# Patient Record
Sex: MALE | Race: WHITE | NOT HISPANIC OR LATINO
[De-identification: names, ages, dates, MRNs, and addresses within clinical notes are randomized per-mention and may not be internally consistent; named-entity substitution may affect disease eponyms.]

---

## 2021-01-14 PROBLEM — Z00.00 ENCOUNTER FOR PREVENTIVE HEALTH EXAMINATION: Status: ACTIVE | Noted: 2021-01-14

## 2021-01-15 ENCOUNTER — APPOINTMENT (OUTPATIENT)
Dept: CARDIOLOGY | Facility: CLINIC | Age: 56
End: 2021-01-15
Payer: COMMERCIAL

## 2021-01-15 ENCOUNTER — NON-APPOINTMENT (OUTPATIENT)
Age: 56
End: 2021-01-15

## 2021-01-15 VITALS
DIASTOLIC BLOOD PRESSURE: 80 MMHG | HEART RATE: 97 BPM | RESPIRATION RATE: 16 BRPM | OXYGEN SATURATION: 96 % | HEIGHT: 69 IN | BODY MASS INDEX: 31.1 KG/M2 | TEMPERATURE: 97.7 F | WEIGHT: 210 LBS | SYSTOLIC BLOOD PRESSURE: 121 MMHG

## 2021-01-15 DIAGNOSIS — R06.83 SNORING: ICD-10-CM

## 2021-01-15 DIAGNOSIS — F41.9 ANXIETY DISORDER, UNSPECIFIED: ICD-10-CM

## 2021-01-15 DIAGNOSIS — F32.9 MAJOR DEPRESSIVE DISORDER, SINGLE EPISODE, UNSPECIFIED: ICD-10-CM

## 2021-01-15 DIAGNOSIS — R53.83 OTHER FATIGUE: ICD-10-CM

## 2021-01-15 PROCEDURE — 99072 ADDL SUPL MATRL&STAF TM PHE: CPT

## 2021-01-15 PROCEDURE — 93000 ELECTROCARDIOGRAM COMPLETE: CPT

## 2021-01-15 PROCEDURE — 99244 OFF/OP CNSLTJ NEW/EST MOD 40: CPT

## 2021-01-15 NOTE — PHYSICAL EXAM
[General Appearance - In No Acute Distress] : no acute distress [Normal Conjunctiva] : the conjunctiva exhibited no abnormalities [Normal Oral Mucosa] : normal oral mucosa [Auscultation Breath Sounds / Voice Sounds] : lungs were clear to auscultation bilaterally [Abdomen Soft] : soft [Abdomen Tenderness] : non-tender [Nail Clubbing] : no clubbing of the fingernails [Cyanosis, Localized] : no localized cyanosis [Oriented To Time, Place, And Person] : oriented to person, place, and time [Affect] : the affect was normal [FreeTextEntry1] : Cardiac: Normal S1 and split S2, no S3, no S4. No audible murmurs or rubs. Regular rate and rhythm.

## 2021-01-15 NOTE — HISTORY OF PRESENT ILLNESS
[FreeTextEntry1] : Patient presents today because since he had COVID-19, which was diagnosed on December 12 he has been having issues with high heart rate.  The illness itself was not very severe, primarily he had issues with chills and loss of taste and smell.  However since that time he has been tired and noticed his heart rate has been between 100 and 115 whenever he checks it.  He checks it a couple of times a day and it is always in the same range.  He does not report any symptoms when his heart rate is elevated.  He denies any palpitations, dizziness or lightheadedness.  He reports really no other physical symptoms besides the very significant fatigue.  Patient denies chest pain, shortness of breath, orthopnea, presyncope, syncope.

## 2021-01-15 NOTE — DISCUSSION/SUMMARY
[FreeTextEntry1] : 1.  Check echocardiogram given his tachycardia.\par 2.  Check Holter monitor to evaluate the degree of tachycardia and for any arrhythmia.\par 3.  Check exercise stress test given his fatigue on exertion and his tachycardia to rule out other arrhythmia.\par 4.  Check CTPA to rule out PE given COVID-19 and unexplained tachycardia.\par 5.  He will have blood work done.\par 6.  Check sleep study given his snoring and fatigue.\par 7.  Patient encouraged to work on diet to lose weight.\par 8.  Follow up here after testing, and will make further recommendations at that time.

## 2021-01-15 NOTE — ASSESSMENT
[FreeTextEntry1] : EKG: Sinus rhythm with no significant ST or T wave changes.\par \par 55-year-old man with no significant past medical history presents to me because of elevated heart rate since he had COVID-19 last month.  It is likely that the elevated heart rates are related to the illness.  He also has significant fatigue which is also likely related to his illness.  He is in sinus rhythm here today.  I will plan additional monitoring.  Also his symptoms are exertional and I will evaluate that as well.  Finally given the tachycardia and COVID-19 I will do a CT to rule out PE as another potential cause.  He also has significant snoring and a very short neck and I have recommended sleep study to rule out sleep apnea.

## 2021-02-10 ENCOUNTER — APPOINTMENT (OUTPATIENT)
Dept: DISASTER EMERGENCY | Facility: CLINIC | Age: 56
End: 2021-02-10

## 2021-02-11 LAB — SARS-COV-2 N GENE NPH QL NAA+PROBE: NOT DETECTED

## 2021-02-13 ENCOUNTER — OUTPATIENT (OUTPATIENT)
Dept: OUTPATIENT SERVICES | Facility: HOSPITAL | Age: 56
LOS: 1 days | End: 2021-02-13
Payer: COMMERCIAL

## 2021-02-13 DIAGNOSIS — G47.33 OBSTRUCTIVE SLEEP APNEA (ADULT) (PEDIATRIC): ICD-10-CM

## 2021-02-13 PROCEDURE — 95810 POLYSOM 6/> YRS 4/> PARAM: CPT | Mod: 26

## 2021-02-13 PROCEDURE — 95810 POLYSOM 6/> YRS 4/> PARAM: CPT

## 2021-02-15 ENCOUNTER — TRANSCRIPTION ENCOUNTER (OUTPATIENT)
Age: 56
End: 2021-02-15

## 2021-02-23 ENCOUNTER — APPOINTMENT (OUTPATIENT)
Dept: CARDIOLOGY | Facility: CLINIC | Age: 56
End: 2021-02-23

## 2021-03-09 ENCOUNTER — APPOINTMENT (OUTPATIENT)
Dept: CARDIOLOGY | Facility: CLINIC | Age: 56
End: 2021-03-09

## 2021-03-10 ENCOUNTER — APPOINTMENT (OUTPATIENT)
Dept: CARDIOLOGY | Facility: CLINIC | Age: 56
End: 2021-03-10
Payer: COMMERCIAL

## 2021-03-10 VITALS
SYSTOLIC BLOOD PRESSURE: 134 MMHG | HEART RATE: 104 BPM | TEMPERATURE: 97.6 F | HEIGHT: 69 IN | DIASTOLIC BLOOD PRESSURE: 84 MMHG | RESPIRATION RATE: 16 BRPM | WEIGHT: 212 LBS | BODY MASS INDEX: 31.4 KG/M2

## 2021-03-10 DIAGNOSIS — U07.1 COVID-19: ICD-10-CM

## 2021-03-10 PROCEDURE — 99214 OFFICE O/P EST MOD 30 MIN: CPT

## 2021-03-10 PROCEDURE — 99072 ADDL SUPL MATRL&STAF TM PHE: CPT

## 2021-03-10 NOTE — ASSESSMENT
[FreeTextEntry1] : Holter monitor January 15, 2021 showed a presenting rhythm is sinus with an average heart rate of 90 bpm, maximum 137, minimum 67 bpm. No arrhythmias were noted.\par \par Sleep study February 13, 2021 showed an AHI of 77.2 consistent with very severe sleep apnea. Keny oxygen saturation of 79%. 18.9% of the study spent with a saturation below 90%. No deep sleep was seen.\par \par 55-year-old man with no significant past medical history presented to me because of elevated heart rate since he had COVID-19. Patient has yet to complete echocardiogram, stress testing and CAT scan but those should still be done. His heart rates overall are not excessively elevated on his Holter monitor from January. His sleep study does show very severe sleep apnea and is certainly needs to be treated with CPAP therapy. He does need to work on losing weight and ultimately this would help as well. Blood pressures are somewhat borderline but I will not add any medication for now. Blood work shows reasonable control of his lipids but borderline evidence of diabetes. He may ultimately require statin therapy but I will also hold off on that for now.

## 2021-03-10 NOTE — HISTORY OF PRESENT ILLNESS
[FreeTextEntry1] : Patient presents back today not having had any of the testing I wanted except for the Holter we did when he was here in the sleep study. He said he had to leave town and that was why it was not done. He has been checking his blood pressure at home and has been in the 120s to 130s over 70s to 80s. He does not know exactly what the heart rates have been but he feels like they have been improved. He reports no palpitations or any new symptoms at this time. He is still having issues with fatigue. Patient denies chest pain, shortness of breath, palpitations, orthopnea, presyncope, syncope.

## 2021-03-10 NOTE — DISCUSSION/SUMMARY
[FreeTextEntry1] : 1. Check echocardiogram given his tachycardia.\par 2. Check exercise stress test given his fatigue on exertion and his tachycardia to rule out other arrhythmia.\par 3. Check CTPA to rule out PE given COVID-19 and unexplained tachycardia.\par 4. Follow-up with sleep medicine for treatment of his sleep apnea.\par 5. No additional cardiac medications at this time.\par 6. Patient encouraged to work on diet to lose weight.\par 7. Follow up here after testing, and will make further recommendations at that time.

## 2021-03-15 DIAGNOSIS — Z01.818 ENCOUNTER FOR OTHER PREPROCEDURAL EXAMINATION: ICD-10-CM

## 2021-03-17 ENCOUNTER — APPOINTMENT (OUTPATIENT)
Dept: DISASTER EMERGENCY | Facility: CLINIC | Age: 56
End: 2021-03-17

## 2021-03-17 LAB — SARS-COV-2 N GENE NPH QL NAA+PROBE: NOT DETECTED

## 2021-03-21 ENCOUNTER — OUTPATIENT (OUTPATIENT)
Dept: OUTPATIENT SERVICES | Facility: HOSPITAL | Age: 56
LOS: 1 days | End: 2021-03-21
Payer: COMMERCIAL

## 2021-03-21 DIAGNOSIS — G47.33 OBSTRUCTIVE SLEEP APNEA (ADULT) (PEDIATRIC): ICD-10-CM

## 2021-03-21 PROCEDURE — 95811 POLYSOM 6/>YRS CPAP 4/> PARM: CPT | Mod: 26

## 2021-03-21 PROCEDURE — 95811 POLYSOM 6/>YRS CPAP 4/> PARM: CPT

## 2021-03-26 ENCOUNTER — TRANSCRIPTION ENCOUNTER (OUTPATIENT)
Age: 56
End: 2021-03-26

## 2021-04-12 ENCOUNTER — APPOINTMENT (OUTPATIENT)
Dept: PULMONOLOGY | Facility: CLINIC | Age: 56
End: 2021-04-12
Payer: COMMERCIAL

## 2021-04-12 VITALS
WEIGHT: 224 LBS | TEMPERATURE: 97.5 F | DIASTOLIC BLOOD PRESSURE: 80 MMHG | HEART RATE: 89 BPM | SYSTOLIC BLOOD PRESSURE: 130 MMHG | BODY MASS INDEX: 34.75 KG/M2 | OXYGEN SATURATION: 98 % | HEIGHT: 67.5 IN

## 2021-04-12 DIAGNOSIS — Z23 ENCOUNTER FOR IMMUNIZATION: ICD-10-CM

## 2021-04-12 PROCEDURE — 99072 ADDL SUPL MATRL&STAF TM PHE: CPT

## 2021-04-12 PROCEDURE — 99204 OFFICE O/P NEW MOD 45 MIN: CPT

## 2021-04-12 RX ORDER — ESCITALOPRAM OXALATE 5 MG/1
TABLET, FILM COATED ORAL
Refills: 0 | Status: DISCONTINUED | COMMUNITY
End: 2021-04-12

## 2021-04-20 ENCOUNTER — APPOINTMENT (OUTPATIENT)
Dept: CARDIOLOGY | Facility: CLINIC | Age: 56
End: 2021-04-20
Payer: COMMERCIAL

## 2021-04-20 PROCEDURE — 93015 CV STRESS TEST SUPVJ I&R: CPT

## 2021-04-20 PROCEDURE — 99072 ADDL SUPL MATRL&STAF TM PHE: CPT

## 2021-04-20 PROCEDURE — 93306 TTE W/DOPPLER COMPLETE: CPT

## 2021-04-26 ENCOUNTER — APPOINTMENT (OUTPATIENT)
Dept: CARDIOLOGY | Facility: CLINIC | Age: 56
End: 2021-04-26
Payer: COMMERCIAL

## 2021-04-26 DIAGNOSIS — R00.0 TACHYCARDIA, UNSPECIFIED: ICD-10-CM

## 2021-04-26 PROCEDURE — 99213 OFFICE O/P EST LOW 20 MIN: CPT | Mod: 95

## 2021-04-26 NOTE — DISCUSSION/SUMMARY
[FreeTextEntry1] : 1. No additional cardiac testing at this time.\par 2. Continue CPAP for sleep apnea\par 3. No additional cardiac medications at this time.\par 4. Patient encouraged to work on diet to lose weight.\par 5. Patient is encouraged to exercise at least 30 minutes a day everyday of the week.\par 6. Follow up here in six months.\par

## 2021-04-26 NOTE — ASSESSMENT
[FreeTextEntry1] : Holter monitor January 15, 2021 showed a presenting rhythm is sinus with an average heart rate of 90 bpm, maximum 137, minimum 67 bpm. No arrhythmias were noted.\par \par Sleep study February 13, 2021 showed an AHI of 77.2 consistent with very severe sleep apnea. Keny oxygen saturation of 79%. 18.9% of the study spent with a saturation below 90%. No deep sleep was seen.\par \par Echocardiogram April 20, 2021 demonstrated left ventricle normal size and function with ejection fraction of 55 to 60%.  No significant structural abnormalities noted.\par \par Exercise stress test performed April 20, 2021 during which the patient exercised via a Homar protocol for 6 minutes and 50 seconds, totaling 8 METS.  Peak heart rate achieved was 171 bpm, representing 103% of age-predicted maximum.  Blood pressure sponsor was normal.  EKG showed no evidence of ischemia with exercise.\par \par 55-year-old man with no significant past medical history presented to me because of elevated heart rate since he had COVID-19.  The rest of his cardiac testing is relatively unremarkable.  His echocardiogram is essentially normal and he did a decent night of exercise on his stress test with no evidence of ischemia or arrhythmia.  Certainly he needs to work on losing weight and exercising.  Thankfully he has started using a CPAP machine and I have encouraged compliance.  There is no need for additional cardiac work-up at this time.

## 2021-04-26 NOTE — REASON FOR VISIT
[Home] : at home, [unfilled] , at the time of the visit. [Medical Office: ([facility])___] : at the medical office located in  [Verbal consent obtained from patient] : the patient, [unfilled] [FreeTextEntry1] : Follow-up of tachycardia

## 2021-04-26 NOTE — HISTORY OF PRESENT ILLNESS
[FreeTextEntry1] : Patient presents back today feeling well and offering no complaints.  He recently got his CPAP mask and wore it for the first time last night.  He felt well wearing it and his wife noted that he did not snore.  He is trying to work on his diet a little bit to lose weight but has yet to have any significant success.  He reports no exertional symptoms.  Patient denies chest pain, shortness of breath, palpitations, orthopnea, presyncope, syncope.

## 2021-05-26 ENCOUNTER — APPOINTMENT (OUTPATIENT)
Dept: PULMONOLOGY | Facility: CLINIC | Age: 56
End: 2021-05-26
Payer: COMMERCIAL

## 2021-05-26 DIAGNOSIS — G47.33 OBSTRUCTIVE SLEEP APNEA (ADULT) (PEDIATRIC): ICD-10-CM

## 2021-05-26 DIAGNOSIS — E66.9 OBESITY, UNSPECIFIED: ICD-10-CM

## 2021-05-26 PROCEDURE — 99214 OFFICE O/P EST MOD 30 MIN: CPT | Mod: 95

## 2021-08-05 ENCOUNTER — TRANSCRIPTION ENCOUNTER (OUTPATIENT)
Age: 56
End: 2021-08-05

## 2022-04-14 ENCOUNTER — TRANSCRIPTION ENCOUNTER (OUTPATIENT)
Age: 57
End: 2022-04-14
